# Patient Record
Sex: FEMALE | Race: BLACK OR AFRICAN AMERICAN | NOT HISPANIC OR LATINO | Employment: UNEMPLOYED | URBAN - METROPOLITAN AREA
[De-identification: names, ages, dates, MRNs, and addresses within clinical notes are randomized per-mention and may not be internally consistent; named-entity substitution may affect disease eponyms.]

---
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
## 2023-01-06 ENCOUNTER — OFFICE VISIT (OUTPATIENT)
Dept: OBSTETRICS AND GYNECOLOGY | Facility: CLINIC | Age: 18
End: 2023-01-06
Payer: COMMERCIAL

## 2023-01-06 DIAGNOSIS — N94.6 DYSMENORRHEA: Primary | ICD-10-CM

## 2023-01-06 PROCEDURE — 99212 PR OFFICE/OUTPT VISIT, EST, LEVL II, 10-19 MIN: ICD-10-PCS | Mod: 95,,, | Performed by: OBSTETRICS & GYNECOLOGY

## 2023-01-06 PROCEDURE — 99212 OFFICE O/P EST SF 10 MIN: CPT | Mod: 95,,, | Performed by: OBSTETRICS & GYNECOLOGY

## 2023-01-06 NOTE — PROGRESS NOTES
The patient location is: home  The chief complaint leading to consultation is: medication follow up    Visit type: audiovisual    Face to Face time with patient: 8 min  10 minutes of total time spent on the encounter, which includes face to face time and non-face to face time preparing to see the patient (eg, review of tests), Obtaining and/or reviewing separately obtained history, Documenting clinical information in the electronic or other health record, Independently interpreting results (not separately reported) and communicating results to the patient/family/caregiver, or Care coordination (not separately reported).         Each patient to whom he or she provides medical services by telemedicine is:  (1) informed of the relationship between the physician and patient and the respective role of any other health care provider with respect to management of the patient; and (2) notified that he or she may decline to receive medical services by telemedicine and may withdraw from such care at any time.    Notes:   Subjective:       Patient ID: Yakelin Gray is a 17 y.o. female.    Chief Complaint:  No chief complaint on file.      History of Present Illness  16 yo G0 with history of dysmenorrhea presents for follow up after trial of alternative MARTHA.  Reports no break though bleeding or dysmenorrhea since switching to continuous Viorele.  Denies side effects.  Denies other questions or concerns.     Patient Active Problem List   Diagnosis    Abnormality of gait    Congenital diplegia    Macular pigment deposit    ROP (retinopathy of prematurity), bilateral s/p Laser treatment    Congenital esotropia s/p strabismus surgery at age 1    Anisometropia    Amblyopia of left eye    Regular astigmatism of both eyes    Low vision of left eye       Past Medical History:   Diagnosis Date    Cerebral palsy     Hemorrhage into ventricle     grad 3 in  period    Retinopathy of prematurity        Past Surgical History:    Procedure Laterality Date    FOOT CAPSULE RELEASE W/ PERCUTANEOUS HEEL CORD LENGTHENING, TIBIAL TENDON TRANSFER      hamstring lenghtening      STRABISMUS SURGERY         OB History    Para Term  AB Living   0 0 0 0 0 0   SAB IAB Ectopic Multiple Live Births   0 0 0 0 0   Obstetric Comments   10/R/8       No LMP recorded.   Date of Last Pap: No result found         Objective:   There were no vitals taken for this visit.  There is no height or weight on file to calculate BMI.    APPEARANCE: Well nourished, well developed, in no acute distress.  PSYCH: Appropriate mood and affect.  SKIN: No acne or hirsutism         Results for orders placed or performed in visit on 22   Urinalysis, Reflex to Urine Culture Urine, Clean Catch    Specimen: Urine   Result Value Ref Range    Specimen UA Urine, Clean Catch     Color, UA Yellow Yellow, Straw, Sarah    Appearance, UA Hazy (A) Clear    pH, UA 6.0 5.0 - 8.0    Specific Gravity, UA 1.030 1.005 - 1.030    Protein, UA 1+ (A) Negative    Glucose, UA Negative Negative    Ketones, UA 1+ (A) Negative    Bilirubin (UA) Negative Negative    Occult Blood UA 3+ (A) Negative    Nitrite, UA Negative Negative    Leukocytes, UA Negative Negative   Urinalysis Microscopic   Result Value Ref Range    RBC, UA >100 (H) 0 - 4 /hpf    WBC, UA 3 0 - 5 /hpf    Bacteria Few (A) None-Occ /hpf    Squam Epithel, UA 1 /hpf    Hyaline Casts, UA 4 (A) 0-1/lpf /lpf    Microscopic Comment SEE COMMENT      EXAMINATION:  US PELVIS COMPLETE NON OB     CLINICAL HISTORY:  Dysmenorrhea, unspecified     TECHNIQUE:  Transabdominal images of the pelvis were obtained.     COMPARISON:  None     FINDINGS:  The uterus measures 7.5 x 2.5 x 3.4 cm and is smooth in contour.  The apex of the uterine fundus is obscured secondary to intestinal gas shadowing.  The endometrial stripe measures 2 mm in thickness and is within normal limits for the patient's age.     The right ovary is not visualized.  There is  no adnexal mass.     The left ovary measures 1.9 x 1.6 x 1.5 cm and demonstrates normal appearing stroma.  Normal color flow and Doppler signals are demonstrated from the left ovarian stroma.  Left ovarian resistive index 0.5.     There is no pelvic free fluid.     Impression:     1. Normal size and uterine volume with normal thickness endometrial stripe.  2. Nonvisualization of the right ovary.  Normal appearance of the left ovary.        Electronically signed by: Ben Ledesma MD  Date:                                            11/07/2022  Time:                                           12:58    Assessment/ Plan:     1. Dysmenorrhea          AUB and dysmenorrhea well controlled with Viorele.  Continue continuous MARTHA.      Follow up for annual well woman exam or as needed.            Mary Jimenez MD  [facility]  01/06/2023

## 2023-01-06 NOTE — PATIENT INSTRUCTIONS
Please check out the [employer] College of [employer]ians [employer] PATIENT WEBSITE.  The site has education materials, patient stories, expert views, and a portal for you to ask questions.       [email or web address]      As always, please let me know if you have any questions.     Dr. Mary Jimenez

## 2023-02-01 ENCOUNTER — PATIENT MESSAGE (OUTPATIENT)
Dept: PEDIATRICS | Facility: CLINIC | Age: 18
End: 2023-02-01
Payer: COMMERCIAL

## 2023-03-30 ENCOUNTER — PATIENT MESSAGE (OUTPATIENT)
Dept: OBSTETRICS AND GYNECOLOGY | Facility: CLINIC | Age: 18
End: 2023-03-30
Payer: COMMERCIAL

## 2023-08-03 ENCOUNTER — PATIENT MESSAGE (OUTPATIENT)
Dept: OBSTETRICS AND GYNECOLOGY | Facility: CLINIC | Age: 18
End: 2023-08-03
Payer: COMMERCIAL

## 2023-08-03 DIAGNOSIS — N94.6 DYSMENORRHEA: ICD-10-CM

## 2023-08-03 DIAGNOSIS — Z30.41 ENCOUNTER FOR SURVEILLANCE OF CONTRACEPTIVE PILLS: ICD-10-CM

## 2023-08-04 RX ORDER — DESOGESTREL AND ETHINYL ESTRADIOL 21-5 (28)
1 KIT ORAL DAILY
Qty: 84 TABLET | Refills: 0 | Status: SHIPPED | OUTPATIENT
Start: 2023-08-04 | End: 2023-09-22 | Stop reason: SDUPTHER

## 2023-08-29 ENCOUNTER — PATIENT MESSAGE (OUTPATIENT)
Dept: INTERNAL MEDICINE | Facility: CLINIC | Age: 18
End: 2023-08-29
Payer: COMMERCIAL

## 2023-09-22 ENCOUNTER — OFFICE VISIT (OUTPATIENT)
Dept: OBSTETRICS AND GYNECOLOGY | Facility: CLINIC | Age: 18
End: 2023-09-22
Payer: COMMERCIAL

## 2023-09-22 VITALS
HEIGHT: 57 IN | BODY MASS INDEX: 22.73 KG/M2 | SYSTOLIC BLOOD PRESSURE: 122 MMHG | WEIGHT: 105.38 LBS | DIASTOLIC BLOOD PRESSURE: 74 MMHG

## 2023-09-22 DIAGNOSIS — Z30.41 ENCOUNTER FOR SURVEILLANCE OF CONTRACEPTIVE PILLS: ICD-10-CM

## 2023-09-22 DIAGNOSIS — N92.1 BREAKTHROUGH BLEEDING ON BIRTH CONTROL PILLS: ICD-10-CM

## 2023-09-22 DIAGNOSIS — N94.6 DYSMENORRHEA: ICD-10-CM

## 2023-09-22 DIAGNOSIS — Z01.419 ENCOUNTER FOR ANNUAL ROUTINE GYNECOLOGICAL EXAMINATION: Primary | ICD-10-CM

## 2023-09-22 PROCEDURE — 3078F PR MOST RECENT DIASTOLIC BLOOD PRESSURE < 80 MM HG: ICD-10-PCS | Mod: CPTII,S$GLB,, | Performed by: OBSTETRICS & GYNECOLOGY

## 2023-09-22 PROCEDURE — 99395 PREV VISIT EST AGE 18-39: CPT | Mod: S$GLB,,, | Performed by: OBSTETRICS & GYNECOLOGY

## 2023-09-22 PROCEDURE — 99395 PR PREVENTIVE VISIT,EST,18-39: ICD-10-PCS | Mod: S$GLB,,, | Performed by: OBSTETRICS & GYNECOLOGY

## 2023-09-22 PROCEDURE — 3074F SYST BP LT 130 MM HG: CPT | Mod: CPTII,S$GLB,, | Performed by: OBSTETRICS & GYNECOLOGY

## 2023-09-22 PROCEDURE — 3008F PR BODY MASS INDEX (BMI) DOCUMENTED: ICD-10-PCS | Mod: CPTII,S$GLB,, | Performed by: OBSTETRICS & GYNECOLOGY

## 2023-09-22 PROCEDURE — 99999 PR PBB SHADOW E&M-EST. PATIENT-LVL III: CPT | Mod: PBBFAC,,, | Performed by: OBSTETRICS & GYNECOLOGY

## 2023-09-22 PROCEDURE — 3078F DIAST BP <80 MM HG: CPT | Mod: CPTII,S$GLB,, | Performed by: OBSTETRICS & GYNECOLOGY

## 2023-09-22 PROCEDURE — 3074F PR MOST RECENT SYSTOLIC BLOOD PRESSURE < 130 MM HG: ICD-10-PCS | Mod: CPTII,S$GLB,, | Performed by: OBSTETRICS & GYNECOLOGY

## 2023-09-22 PROCEDURE — 1159F PR MEDICATION LIST DOCUMENTED IN MEDICAL RECORD: ICD-10-PCS | Mod: CPTII,S$GLB,, | Performed by: OBSTETRICS & GYNECOLOGY

## 2023-09-22 PROCEDURE — 1159F MED LIST DOCD IN RCRD: CPT | Mod: CPTII,S$GLB,, | Performed by: OBSTETRICS & GYNECOLOGY

## 2023-09-22 PROCEDURE — 3008F BODY MASS INDEX DOCD: CPT | Mod: CPTII,S$GLB,, | Performed by: OBSTETRICS & GYNECOLOGY

## 2023-09-22 PROCEDURE — 99999 PR PBB SHADOW E&M-EST. PATIENT-LVL III: ICD-10-PCS | Mod: PBBFAC,,, | Performed by: OBSTETRICS & GYNECOLOGY

## 2023-09-22 RX ORDER — DESOGESTREL AND ETHINYL ESTRADIOL 21-5 (28)
1 KIT ORAL DAILY
Qty: 84 TABLET | Refills: 4 | Status: SHIPPED | OUTPATIENT
Start: 2023-09-22

## 2023-09-22 NOTE — PATIENT INSTRUCTIONS
Please check out the [employer] College of Obst[employer]ians [employer] Gynec[employer] PATIENT WEBSITE.  The site has education materials, patient stories, expert views, and a portal for you to ask questions.       [email or web address]      As always, please let me know if you have any questions.     Dr. Mary Jimenez       Was seen today and Rx's were faxed. See OV from today.

## 2023-09-22 NOTE — PROGRESS NOTES
"    Chief Complaint: Well Woman Exam     HPI:      Yakelin Gray is a 18 y.o.  who presents for annual exam. She is currently complaining of  irregular breakthrough bleeding on continuous MARTHA.  Denies dysmenorrhea.  Denies heavy menses.  No acne, headaches . Denies breast complaints.  No bowel or bladder concerns.     Ms. Gray has never been sexually active. She declines STD screening today. Patient does not have regular monthly menses. No LMP recorded (lmp unknown). (Menstrual status: Birth Control). She is currently using oral contraceptives (estrogen/progesterone) for contraception.    Previous Pap:      (No result found)  Previous Mammogram: No results found for this or any previous visit.  Most Recent Dexa: not indicated  Colonoscopy: never had    COVID vaccine: completed series  Gardasil:Completed     Patient Active Problem List   Diagnosis    Abnormality of gait    Congenital diplegia    Macular pigment deposit    ROP (retinopathy of prematurity), bilateral s/p Laser treatment    Congenital esotropia s/p strabismus surgery at age 1    Anisometropia    Amblyopia of left eye    Regular astigmatism of both eyes    Low vision of left eye       Past Medical History:   Diagnosis Date    Cerebral palsy     Hemorrhage into ventricle     grad 3 in  period    Retinopathy of prematurity        Past Surgical History:   Procedure Laterality Date    FOOT CAPSULE RELEASE W/ PERCUTANEOUS HEEL CORD LENGTHENING, TIBIAL TENDON TRANSFER      hamstring lenghtening      STRABISMUS SURGERY         OB History          0    Para   0    Term   0       0    AB   0    Living   0         SAB   0    IAB   0    Ectopic   0    Multiple   0    Live Births   0           Obstetric Comments   10/R/8                 ROS:     Review of Systems    Physical Exam:      PHYSICAL EXAM:  /74   Ht 4' 9" (1.448 m)   Wt 47.8 kg (105 lb 6.1 oz)   LMP  (LMP Unknown)   BMI 22.80 kg/m²   Body mass index is 22.8 kg/m². "     APPEARANCE: Well nourished, well developed, in no acute distress.  PSYCH: Appropriate mood and affect.  SKIN: No acne or hirsutism  NECK: Neck symmetric without masses or thyromegaly  NODES: No inguinal, axillary, or supraclavicular lymph node enlargement  CHEST: Normal respiratory effort.  ABDOMEN: Soft.  No tenderness or masses.   BREASTS: Symmetrical, no skin changes or visible lesions.  No palpable masses or nipple discharge bilaterally.  PELVIC: Normal external genitalia without lesions.  Normal hair distribution.  Adequate perineal body, normal urethral meatus.  Vagina moist and well rugated without lesions or discharge.  Cervix pink, without lesions, discharge or tenderness.  No significant cystocele or rectocele.  Bimanual exam shows uterus to be normal size, regular, mobile and nontender.  Adnexa without masses or tenderness.    EXTREMITIES: No edema.    Assessment:     1. Encounter for annual routine gynecological examination        2. Encounter for surveillance of contraceptive pills  desog-e.estradioL/e.estradioL (VIORELE, 28,) 0.15-0.02 mgx21 /0.01 mg x 5 per tablet      3. Breakthrough bleeding on birth control pills        4. Dysmenorrhea  desog-e.estradioL/e.estradioL (VIORELE, 28,) 0.15-0.02 mgx21 /0.01 mg x 5 per tablet            Plan:     Clinical breast exam at 19.  Pap at 21.  Mammogram at 40 or as needed.  DEXA at 65.  Colonoscopy at 45 or as needed.  Contraception: continue continuous MARTHA, reassured patient with BTB.  Notify clinic if bleeding becomes consistent or heavy.  Would recommend 2 week break from MARTHA.   Follow up in about 1 year (around 9/22/2024) for annual well woman exam or as needed.    Counseling:     Patient was counseled today on current ASCCP pap guidelines, the recommendation for yearly pelvic exams, healthy diet and exercise routines, breast self awareness. She is to see her PCP for other health maintenance.       Use of the [employer] Patient Portal discussed and  encouraged during today's visit.         Mary Jimenez MD  [facility]  09/22/2023

## 2024-05-13 ENCOUNTER — PATIENT MESSAGE (OUTPATIENT)
Dept: OBSTETRICS AND GYNECOLOGY | Facility: CLINIC | Age: 19
End: 2024-05-13
Payer: COMMERCIAL

## 2024-09-09 DIAGNOSIS — N94.6 DYSMENORRHEA: ICD-10-CM

## 2024-09-09 DIAGNOSIS — Z30.41 ENCOUNTER FOR SURVEILLANCE OF CONTRACEPTIVE PILLS: ICD-10-CM

## 2024-09-09 RX ORDER — DESOGESTREL AND ETHINYL ESTRADIOL AND ETHINYL ESTRADIOL 21-5 (28)
KIT ORAL
Qty: 84 TABLET | Refills: 0 | Status: SHIPPED | OUTPATIENT
Start: 2024-09-09

## 2024-09-10 NOTE — TELEPHONE ENCOUNTER
Refill Decision Note   Yakelin Gray  is requesting a refill authorization.  Brief Assessment and Rationale for Refill:  Approve     Medication Therapy Plan:       Medication Reconciliation Completed: No   Comments:     No Care Gaps recommended.     Note composed:8:40 PM 09/09/2024

## 2024-11-06 DIAGNOSIS — Z30.41 ENCOUNTER FOR SURVEILLANCE OF CONTRACEPTIVE PILLS: ICD-10-CM

## 2024-11-06 DIAGNOSIS — N94.6 DYSMENORRHEA: ICD-10-CM

## 2024-11-06 RX ORDER — DESOGESTREL AND ETHINYL ESTRADIOL 21-5 (28)
1 KIT ORAL DAILY
Qty: 84 TABLET | Refills: 0 | Status: SHIPPED | OUTPATIENT
Start: 2024-11-06

## 2024-11-06 NOTE — TELEPHONE ENCOUNTER
----- Message from Ana Luisa sent at 2024  2:10 PM CST -----  [phone]  Type: Patient Call Back    Who called: self     What is the request in detail: pt asked if she can have a refill of her VIORELE, 28, 0.15-0.02 mgx21 /0.01 mg x 5 per tablet to last till her appt in ?       [id number] [address] [facility]  [address]  [facility] [address]  Phone: [phone] Fax: [phone]       Can the clinic reply by [facility]? yes    Would the patient rather a call back or a response via [facility]?  Refill     Best call back number: [phone]

## 2025-01-09 ENCOUNTER — OFFICE VISIT (OUTPATIENT)
Dept: OBSTETRICS AND GYNECOLOGY | Facility: CLINIC | Age: 20
End: 2025-01-09
Payer: COMMERCIAL

## 2025-01-09 VITALS
BODY MASS INDEX: 22.64 KG/M2 | SYSTOLIC BLOOD PRESSURE: 136 MMHG | HEIGHT: 57 IN | WEIGHT: 104.94 LBS | DIASTOLIC BLOOD PRESSURE: 84 MMHG

## 2025-01-09 DIAGNOSIS — Z01.419 ENCOUNTER FOR ANNUAL ROUTINE GYNECOLOGICAL EXAMINATION: Primary | ICD-10-CM

## 2025-01-09 DIAGNOSIS — Z30.41 ENCOUNTER FOR SURVEILLANCE OF CONTRACEPTIVE PILLS: ICD-10-CM

## 2025-01-09 DIAGNOSIS — Z12.4 CERVICAL CANCER SCREENING: ICD-10-CM

## 2025-01-09 DIAGNOSIS — R03.0 ELEVATED BLOOD PRESSURE READING WITHOUT DIAGNOSIS OF HYPERTENSION: ICD-10-CM

## 2025-01-09 PROCEDURE — 99999 PR PBB SHADOW E&M-EST. PATIENT-LVL III: CPT | Mod: PBBFAC,,, | Performed by: OBSTETRICS & GYNECOLOGY

## 2025-01-09 RX ORDER — LEVONORGESTREL/ETHIN.ESTRADIOL 0.1-0.02MG
1 TABLET ORAL DAILY
Qty: 84 TABLET | Refills: 3 | Status: SHIPPED | OUTPATIENT
Start: 2025-01-09 | End: 2026-01-09

## 2025-01-09 NOTE — PROGRESS NOTES
Chief Complaint: Well Woman Exam     HPI:      Yakelin Gray is a 19 y.o.  who presents for annual exam. She is currently complaining of  breakthrough bleeding with use of MARTHA  .    Ms. Gray is not currently sexually active. She declines STD screening today. Patient does not have regular monthly menses. No LMP recorded. (Menstrual status: Birth Control). She is currently using oral contraceptives (estrogen/progesterone) for contraception.    Previous Pap:      (No result found)  Previous Mammogram: No results found for this or any previous visit.    Most Recent Dexa: not indicated  Colonoscopy: never had    COVID vaccine: completed series  Gardasil:Completed     Patient Active Problem List   Diagnosis    Abnormality of gait    Congenital diplegia    Macular pigment deposit    ROP (retinopathy of prematurity), bilateral s/p Laser treatment    Congenital esotropia s/p strabismus surgery at age 1    Anisometropia    Amblyopia of left eye    Regular astigmatism of both eyes    Low vision of left eye       Past Medical History:   Diagnosis Date    Cerebral palsy     Hemorrhage into ventricle     grad 3 in  period    Retinopathy of prematurity        Past Surgical History:   Procedure Laterality Date    FOOT CAPSULE RELEASE W/ PERCUTANEOUS HEEL CORD LENGTHENING, TIBIAL TENDON TRANSFER      hamstring lenghtening      STRABISMUS SURGERY         OB History          0    Para   0    Term   0       0    AB   0    Living   0         SAB   0    IAB   0    Ectopic   0    Multiple   0    Live Births   0           Obstetric Comments   10/R/8                 ROS:     Review of Systems   Constitutional:  Negative for activity change, appetite change and fatigue.   Respiratory:  Negative for shortness of breath.    Cardiovascular:  Negative for chest pain.   Gastrointestinal:  Negative for abdominal pain, constipation and diarrhea.   Endocrine: Negative for cold intolerance and heat intolerance.  "  Genitourinary:  Positive for menstrual problem. Negative for dysuria, frequency, menstrual irregularity, pelvic pain and vaginal discharge.   Integumentary:  Negative for breast mass, breast discharge and breast tenderness.   Psychiatric/Behavioral:  Negative for dysphoric mood. The patient is not nervous/anxious.    Breast: Negative for mass and tenderness      Physical Exam:      PHYSICAL EXAM:  /84   Ht 4' 9" (1.448 m)   Wt 47.6 kg (104 lb 15 oz)   BMI 22.71 kg/m²   Body mass index is 22.71 kg/m².     APPEARANCE: Well nourished, well developed, in no acute distress.  PSYCH: Appropriate mood and affect.  SKIN: No acne or hirsutism  NECK: Neck symmetric without masses or thyromegaly  NODES: No inguinal, axillary, or supraclavicular lymph node enlargement  CHEST: Normal respiratory effort.  ABDOMEN: Soft.  No tenderness or masses.   BREASTS: Symmetrical, no skin changes or visible lesions.  No palpable masses or nipple discharge bilaterally.  PELVIC: Normal external genitalia. Patient unable to tolerate examination speculum or bimanual examination.   EXTREMITIES: No edema.    Assessment:     1. Encounter for annual routine gynecological examination        2. Cervical cancer screening        3. Encounter for surveillance of contraceptive pills  levonorgestrel-ethinyl estradiol 0.1-20 mg-mcg per tablet      4. Elevated blood pressure reading without diagnosis of hypertension              Plan:     Clinical breast exam performed.  Pap attempted, unable to tolerate.  Will reattempt next year.  Mammogram at 38 or as needed.  DEXA at 63 or needed.  Colonoscopy at 43 or as needed.  Contraception: continue MARTHA for menstrual regulation, switch to levonorgestrel-EE due to BTB.  Follow home BP and contact PCP with results.   Follow up in about 1 year (around 1/9/2026) for annual well woman exam or as needed.    Counseling:     Patient was counseled today on current ASCCP pap guidelines, the recommendation for yearly " pelvic exams, healthy diet and exercise routines, breast self awareness and annual mammograms. She is to see her PCP for other health maintenance.       Use of the [facility] Patient Portal discussed and encouraged during today's visit.         Mary Jimenez MD  [facility]  01/09/2025